# Patient Record
Sex: FEMALE | Race: WHITE | URBAN - METROPOLITAN AREA
[De-identification: names, ages, dates, MRNs, and addresses within clinical notes are randomized per-mention and may not be internally consistent; named-entity substitution may affect disease eponyms.]

---

## 2021-04-08 ENCOUNTER — APPOINTMENT (RX ONLY)
Dept: URBAN - METROPOLITAN AREA CLINIC 11 | Facility: CLINIC | Age: 21
Setting detail: DERMATOLOGY
End: 2021-04-08

## 2021-04-08 DIAGNOSIS — N62 HYPERTROPHY OF BREAST: ICD-10-CM

## 2021-04-08 PROCEDURE — 99242 OFF/OP CONSLTJ NEW/EST SF 20: CPT

## 2021-04-08 PROCEDURE — ? PHOTOS OBTAINED

## 2021-04-08 PROCEDURE — ? ORDER FOR SURGERY - BREAST

## 2021-04-08 PROCEDURE — ? COUNSELING - MACROMASTIA

## 2021-04-08 ASSESSMENT — LOCATION SIMPLE DESCRIPTION DERM
LOCATION SIMPLE: RIGHT BREAST
LOCATION SIMPLE: LEFT BREAST

## 2021-04-08 ASSESSMENT — LOCATION DETAILED DESCRIPTION DERM
LOCATION DETAILED: LEFT MEDIAL BREAST 8-9:00 REGION
LOCATION DETAILED: RIGHT LATERAL BREAST 6-7:00 REGION

## 2021-04-08 ASSESSMENT — LOCATION ZONE DERM: LOCATION ZONE: TRUNK

## 2021-04-08 NOTE — HPI: BREAST (BREAST REDUCTION CONSULTATION - INSURANCE)
Which Breast(S) Are You Concerned About?: bilateral breasts
Do You Prefer One Side's Appearance Over The Other?: the right breast
Which Side(S)?: both breasts
How Severe Is The Macromastia?: moderate
Is This A New Presentation, Or A Follow-Up?: Macromastia
Referral Source: [facility]
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

## 2021-04-08 NOTE — PROCEDURE: PHOTOS OBTAINED
Detail Level: Simple
Follow-Up Interval: months
Follow-Up For Additional Photos?: no
Photographed Locations: Photos were obtained. \\n

## 2021-04-08 NOTE — PROCEDURE: COUNSELING - MACROMASTIA
Additional Comments: Insurance
Detail Level: Simple
Anticipated Tissue To Be Removed (In Grams), Left: 550
Height (Cm): 167.64
Anticipated Tissue To Be Removed (In Grams), Right: 650
Weight (Kg): 104.33

## 2021-04-08 NOTE — PROCEDURE: ORDER FOR SURGERY - BREAST
Time Frame Unit: day(s)
Photos Taken?: yes
Date Of Surgery: TBD
Surgery To Be Ordered: Bilateral breast reduction with free nipple grafting
Provider: Angel Luis Fontenot MD
Surgeon: Dr. Fontenot
Estimated Length Of Surgery: 2 hours
Cosmetic, Major Or Minor (For Billing)?: Major (Insurance, 90-day global)
Admission Status: outpatient
Priority: normal
Instructions (Optional): insurance
[facility]
Audit Log: [id number]
Detail Level: Simple